# Patient Record
Sex: MALE | Race: WHITE | NOT HISPANIC OR LATINO | Employment: FULL TIME | URBAN - METROPOLITAN AREA
[De-identification: names, ages, dates, MRNs, and addresses within clinical notes are randomized per-mention and may not be internally consistent; named-entity substitution may affect disease eponyms.]

---

## 2018-12-05 ENCOUNTER — OFFICE VISIT (OUTPATIENT)
Dept: URGENT CARE | Facility: PHYSICIAN GROUP | Age: 73
End: 2018-12-05
Payer: COMMERCIAL

## 2018-12-05 ENCOUNTER — HOSPITAL ENCOUNTER (OUTPATIENT)
Dept: RADIOLOGY | Facility: MEDICAL CENTER | Age: 73
End: 2018-12-05
Attending: FAMILY MEDICINE
Payer: MEDICARE

## 2018-12-05 VITALS
RESPIRATION RATE: 14 BRPM | TEMPERATURE: 97.8 F | OXYGEN SATURATION: 99 % | HEIGHT: 67 IN | HEART RATE: 62 BPM | DIASTOLIC BLOOD PRESSURE: 80 MMHG | BODY MASS INDEX: 25.11 KG/M2 | WEIGHT: 160 LBS | SYSTOLIC BLOOD PRESSURE: 140 MMHG

## 2018-12-05 DIAGNOSIS — S62.652A CLOSED NONDISPLACED FRACTURE OF MIDDLE PHALANX OF RIGHT MIDDLE FINGER, INITIAL ENCOUNTER: ICD-10-CM

## 2018-12-05 DIAGNOSIS — S63.612A SPRAIN OF RIGHT MIDDLE FINGER, UNSPECIFIED SITE OF FINGER, INITIAL ENCOUNTER: ICD-10-CM

## 2018-12-05 PROCEDURE — 99214 OFFICE O/P EST MOD 30 MIN: CPT | Performed by: FAMILY MEDICINE

## 2018-12-05 PROCEDURE — 73140 X-RAY EXAM OF FINGER(S): CPT | Mod: RT

## 2018-12-06 NOTE — PROGRESS NOTES
"Subjective:      Chief Complaint   Patient presents with   • Finger Pain     poss finger facture         HPI       C/o dull achy rt middle finger pain x 3 wks.  He thinks he sprained it while pulling open a compost bin.      Pt has not tried anything for the pain.  Pain is worse with bending it.   States pain has been getting slowly better.   Denies fevers, chills, redness, swelling.         Social History   Substance Use Topics   • Smoking status: Never Smoker   • Smokeless tobacco: Never Used   • Alcohol use Yes      Comment: 1-2 per month             Current Outpatient Prescriptions on File Prior to Visit   Medication Sig Dispense Refill   • methocarbamol (ROBAXIN) 750 MG Tab Take 1 Tab by mouth 3 times a day. 60 Tab 1   • finasteride (PROSCAR) 5 MG Tab Take 5 mg by mouth every day.     • Diclofenac Sodium 1 % Gel Apply 1 Application to skin as directed as needed.     • methocarbamol (ROBAXIN) 750 MG Tab Take 1 Tab by mouth 3 times a day as needed. 30 Tab 1   • aspirin (ASA) 325 MG TABS Take 325 mg by mouth every 6 hours as needed for Mild Pain.     • atorvastatin (LIPITOR) 10 MG TABS Take 10 mg by mouth every day.     • fenofibrate (TRICOR) 145 MG TABS Take 145 mg by mouth every day.       No current facility-administered medications on file prior to visit.          Past Medical History:   Diagnosis Date   • Dyslipidemia 6/15/2016   • BPH (benign prostatic hyperplasia) 6/15/2016   • Cold    • Heart burn    • kidney stones                Review of Systems   Constitutional: Negative for fever.   Respiratory: Negative for cough.    Cardiovascular: Negative for chest pain.   All other systems reviewed and are negative.         Objective:     Blood pressure 140/80, pulse 62, temperature 36.6 °C (97.8 °F), temperature source Temporal, resp. rate 14, height 1.702 m (5' 7\"), weight 72.6 kg (160 lb), SpO2 99 %.    Physical Exam   Constitutional: pt is oriented to person, place, and time. Pt appears well-developed and " well-nourished. No distress.   HENT:   Head: Normocephalic and atraumatic.   Eyes: Conjunctivae are normal.   Cardiovascular: Normal rate.  RRR.  No murmer   Pulmonary/Chest: Effort normal.  CTAB  Musculoskeletal:        Right hand: She exhibits tenderness (at distal rt 3rd digit.  no swelling or erythema). Normal sensation noted. Normal strength noted.   Neurological: pt is alert and oriented to person, place, and time.   Skin: Skin is warm. Pt is not diaphoretic. No erythema.   Nursing note and vitals reviewed.           12/5/2018 4:46 PM    HISTORY/REASON FOR EXAM:  Pain/Deformity Following Trauma. Injury 3 weeks ago with continued third finger pain    TECHNIQUE/EXAM DESCRIPTION AND NUMBER OF VIEWS:  3 views of the RIGHT fingers.    COMPARISON: None    FINDINGS:  There is a nondisplaced third middle phalanx base fracture. No subluxation.    No other fracture    No bridging callus    Mild DIP marginal spurring   Impression       Third middle phalanx base volar plate nondisplaced fracture   Reading Provider Reading Date   Julito Escudero M.D. Dec 5, 2018   Signing Provider Signing Date Signing Time   Julito Escudero M.D. Dec 5, 2018  5:27 PM          Assessment/Plan:     1. Closed nondisplaced fracture of middle phalanx of right middle finger, initial encounter   xray personally reviewed.   There is a nondisplaced third middle phalanx base fracture        fracture is 3 wks old, currently     Will refer to ortho for further management    Rx motrin 800mg tid, prn

## 2021-01-15 DIAGNOSIS — Z23 NEED FOR VACCINATION: ICD-10-CM
